# Patient Record
Sex: FEMALE | Race: BLACK OR AFRICAN AMERICAN | URBAN - METROPOLITAN AREA
[De-identification: names, ages, dates, MRNs, and addresses within clinical notes are randomized per-mention and may not be internally consistent; named-entity substitution may affect disease eponyms.]

---

## 2023-05-31 ENCOUNTER — APPOINTMENT (OUTPATIENT)
Dept: OTHER | Facility: HOSPITAL | Age: 28
End: 2023-05-31
Attending: PREVENTIVE MEDICINE

## 2023-06-02 ENCOUNTER — APPOINTMENT (OUTPATIENT)
Dept: OTHER | Facility: HOSPITAL | Age: 28
End: 2023-06-02
Attending: PREVENTIVE MEDICINE

## 2024-07-13 PROBLEM — E66.9 OBESITY (BMI 30-39.9): Status: ACTIVE | Noted: 2019-09-30

## 2024-07-13 PROBLEM — E66.9 OBESITY: Status: ACTIVE | Noted: 2024-07-13

## 2024-07-16 ENCOUNTER — TELEPHONE (OUTPATIENT)
Dept: FAMILY MEDICINE CLINIC | Facility: CLINIC | Age: 29
End: 2024-07-16

## 2024-07-16 ENCOUNTER — OFFICE VISIT (OUTPATIENT)
Dept: FAMILY MEDICINE CLINIC | Facility: CLINIC | Age: 29
End: 2024-07-16
Payer: COMMERCIAL

## 2024-07-16 VITALS
RESPIRATION RATE: 23 BRPM | BODY MASS INDEX: 49.41 KG/M2 | WEIGHT: 293 LBS | HEIGHT: 64.5 IN | SYSTOLIC BLOOD PRESSURE: 114 MMHG | HEART RATE: 95 BPM | OXYGEN SATURATION: 98 % | TEMPERATURE: 97 F | DIASTOLIC BLOOD PRESSURE: 66 MMHG

## 2024-07-16 DIAGNOSIS — Z3A.01 LESS THAN 8 WEEKS GESTATION OF PREGNANCY (HCC): Primary | ICD-10-CM

## 2024-07-16 DIAGNOSIS — N62 LARGE BREASTS: ICD-10-CM

## 2024-07-16 PROCEDURE — 3074F SYST BP LT 130 MM HG: CPT | Performed by: FAMILY MEDICINE

## 2024-07-16 PROCEDURE — 99203 OFFICE O/P NEW LOW 30 MIN: CPT | Performed by: FAMILY MEDICINE

## 2024-07-16 PROCEDURE — 3008F BODY MASS INDEX DOCD: CPT | Performed by: FAMILY MEDICINE

## 2024-07-16 PROCEDURE — 3078F DIAST BP <80 MM HG: CPT | Performed by: FAMILY MEDICINE

## 2024-07-16 NOTE — PROGRESS NOTES
HPI:     Delphine Ceballos is a 28 year old female presents for    Referral for .  She is a new pt to the clinic.  [facility] and [facility] her $500 which she does not have.  Was told to call insurance to see if covers.  Insurance told her to get a form of medical certification completed.  Previously was on depo but stopped last yr due to insurance change.  Thinking she wants to go on nexplanon.  Is about 5 weeks 3 days based on LMP.  Has a 3 yr old daughter.    Also interested in breast reduction surgery.  Having a lot of back pain due to large breasts.  Wondering where she can go for this.          Medications (Active prior to today's visit):  No current outpatient medications on file.       Allergies:  No Known Allergies    PSFH elements reviewed from today and agreed except as otherwise stated in HPI.  ROS:      Pertinent positives and negatives noted in the the HPI.    PHYSICAL EXAM:     Vitals:    24 1258   BP: 114/66   BP Location: Left arm   Patient Position: Sitting   Cuff Size: large   Pulse: 95   Resp: 23   Temp: 97.3 °F (36.3 °C)   TempSrc: Temporal   SpO2: 98%   Weight: (!) 304 lb 3.2 oz (138 kg)   Height: 5' 4.5\" (1.638 m)     Vital signs reviewed.Appears stated age, well groomed.  Physical Exam  Neurological:      Mental Status: She is alert.   Psychiatric:         Mood and Affect: Mood normal.         Speech: Speech normal.         Behavior: Behavior normal.          ASSESSMENT/PLAN:   28 year old female with    1. Less than 8 weeks gestation of pregnancy (HCC)    2. Large breasts        I have spent 30 minutes in the care of this patient including review of their past medical records and diagnostic tests, updating their chart, seeing the patient, discussing current treatment plans and documenting the encounter.       We reached out to several administrators at [place] and referral department.  We are not aware of medical certification form.  Recommend pt contact her  insurance to see if they can send us the form to review.  In meantime, we [facility]  Recommend contacting insurance for in network plastic surgeon for breast reduction surgery.  [facility] no longer does this type of surgery  F/u in next 1-2 mo for complete px or sooner if needed      Patient/Caregiver Education: There are no barriers to learning. Medical education done.   Outcome: Patient verbalizes understanding and agrees with plan. Advised to call or RTC if symptoms persist or worsen.      7/16/2024  Dulce Hicks, DO    Patient understands plan and follow-up.

## 2024-07-16 NOTE — TELEPHONE ENCOUNTER
Pt completed OV today for referral for .   Per referrals dept referral is needed as Procedure. Patient must have confirmed pregnancy by either US or blood work.     Order pended for provider review

## 2024-07-19 ENCOUNTER — NURSE ONLY (OUTPATIENT)
Dept: FAMILY MEDICINE CLINIC | Facility: CLINIC | Age: 29
End: 2024-07-19
Payer: COMMERCIAL

## 2024-07-19 DIAGNOSIS — Z3A.01 LESS THAN 8 WEEKS GESTATION OF PREGNANCY (HCC): ICD-10-CM

## 2024-07-19 PROCEDURE — 84702 CHORIONIC GONADOTROPIN TEST: CPT | Performed by: FAMILY MEDICINE

## 2024-07-19 NOTE — PROGRESS NOTES
Patient came in for draw of ordered fasting labs. Patient drawn out of right AC, x 1 attempt and tolerated well.  Light green  tube drawn.

## 2024-07-20 LAB — B-HCG SERPL-ACNC: ABNORMAL MIU/ML

## 2024-07-22 ENCOUNTER — TELEPHONE (OUTPATIENT)
Dept: FAMILY MEDICINE CLINIC | Facility: CLINIC | Age: 29
End: 2024-07-22

## 2024-07-23 ENCOUNTER — TELEPHONE (OUTPATIENT)
Dept: FAMILY MEDICINE CLINIC | Facility: CLINIC | Age: 29
End: 2024-07-23

## 2024-07-23 NOTE — TELEPHONE ENCOUNTER
Pt came to drop off form for  that needs to be filled out. Form was placed in nurse bin, pt needs it ASAP.

## 2024-07-24 NOTE — TELEPHONE ENCOUNTER
Referral has already been placed in [facility] and is pending.  Referral updated with date of confirmation of pregnancy. Information added to referral and referral marked as high priority.  Form that was dropped off has been emailed to Larissa Vergara in Referral Dept. Per Larissa Vergara form does not need to be completed and we can disregard form. Awaiting decision.     Patient notified of above and that we will be in touch once approved. She expressed understanding.

## 2024-07-26 NOTE — TELEPHONE ENCOUNTER
Received letter that [facility].     Case #: [id number]

## 2024-07-29 NOTE — TELEPHONE ENCOUNTER
Pt called office asking if her [facility] was [facility] already for her to make her appt.

## 2024-07-29 NOTE — TELEPHONE ENCOUNTER
Pt notified of approval for [facility]. Pt states she [facility] and was told to bring in approved referral.     Referral printed, placed at  for

## 2024-08-12 ENCOUNTER — MED REC SCAN ONLY (OUTPATIENT)
Dept: FAMILY MEDICINE CLINIC | Facility: CLINIC | Age: 29
End: 2024-08-12

## 2024-09-04 ENCOUNTER — OFFICE VISIT (OUTPATIENT)
Dept: FAMILY MEDICINE CLINIC | Facility: CLINIC | Age: 29
End: 2024-09-04
Payer: COMMERCIAL

## 2024-09-04 VITALS
SYSTOLIC BLOOD PRESSURE: 118 MMHG | HEIGHT: 64.5 IN | TEMPERATURE: 98 F | RESPIRATION RATE: 16 BRPM | WEIGHT: 293 LBS | BODY MASS INDEX: 49.41 KG/M2 | DIASTOLIC BLOOD PRESSURE: 72 MMHG | OXYGEN SATURATION: 98 % | HEART RATE: 109 BPM

## 2024-09-04 DIAGNOSIS — R73.01 IMPAIRED FASTING GLUCOSE: ICD-10-CM

## 2024-09-04 DIAGNOSIS — Z13.21 SCREENING FOR ENDOCRINE, NUTRITIONAL, METABOLIC AND IMMUNITY DISORDER: ICD-10-CM

## 2024-09-04 DIAGNOSIS — Z13.0 SCREENING FOR ENDOCRINE, NUTRITIONAL, METABOLIC AND IMMUNITY DISORDER: ICD-10-CM

## 2024-09-04 DIAGNOSIS — Z30.09 BIRTH CONTROL COUNSELING: ICD-10-CM

## 2024-09-04 DIAGNOSIS — Z00.00 ANNUAL PHYSICAL EXAM: Primary | ICD-10-CM

## 2024-09-04 DIAGNOSIS — Z13.0 SCREENING FOR IRON DEFICIENCY ANEMIA: ICD-10-CM

## 2024-09-04 DIAGNOSIS — Z13.6 SCREENING FOR HEART DISEASE: ICD-10-CM

## 2024-09-04 DIAGNOSIS — Z13.29 SCREENING FOR ENDOCRINE, NUTRITIONAL, METABOLIC AND IMMUNITY DISORDER: ICD-10-CM

## 2024-09-04 DIAGNOSIS — Z13.228 SCREENING FOR ENDOCRINE, NUTRITIONAL, METABOLIC AND IMMUNITY DISORDER: ICD-10-CM

## 2024-09-04 PROBLEM — E66.9 OBESITY (BMI 30-39.9): Status: RESOLVED | Noted: 2019-09-30 | Resolved: 2024-09-04

## 2024-09-04 PROBLEM — E66.9 OBESITY: Status: RESOLVED | Noted: 2024-07-13 | Resolved: 2024-09-04

## 2024-09-04 PROBLEM — E66.01 MORBID OBESITY (HCC): Status: ACTIVE | Noted: 2024-07-13

## 2024-09-04 LAB
ALBUMIN SERPL-MCNC: 4.3 G/DL (ref 3.2–4.8)
ALBUMIN/GLOB SERPL: 1.3 {RATIO} (ref 1–2)
ALP LIVER SERPL-CCNC: 95 U/L
ALT SERPL-CCNC: 37 U/L
ANION GAP SERPL CALC-SCNC: 13 MMOL/L (ref 0–18)
AST SERPL-CCNC: 26 U/L (ref ?–34)
BASOPHILS # BLD AUTO: 0.03 X10(3) UL (ref 0–0.2)
BASOPHILS NFR BLD AUTO: 0.3 %
BILIRUB SERPL-MCNC: 0.4 MG/DL (ref 0.3–1.2)
BUN BLD-MCNC: 8 MG/DL (ref 9–23)
CALCIUM BLD-MCNC: 9.8 MG/DL (ref 8.7–10.4)
CHLORIDE SERPL-SCNC: 104 MMOL/L (ref 98–112)
CHOLEST SERPL-MCNC: 129 MG/DL (ref ?–200)
CO2 SERPL-SCNC: 20 MMOL/L (ref 21–32)
CREAT BLD-MCNC: 0.64 MG/DL
EGFRCR SERPLBLD CKD-EPI 2021: 125 ML/MIN/1.73M2 (ref 60–?)
EOSINOPHIL # BLD AUTO: 0.11 X10(3) UL (ref 0–0.7)
EOSINOPHIL NFR BLD AUTO: 1.2 %
ERYTHROCYTE [DISTWIDTH] IN BLOOD BY AUTOMATED COUNT: 13.2 %
FASTING PATIENT LIPID ANSWER: YES
FASTING STATUS PATIENT QL REPORTED: YES
GLOBULIN PLAS-MCNC: 3.4 G/DL (ref 2–3.5)
GLUCOSE BLD-MCNC: 86 MG/DL (ref 70–99)
HCT VFR BLD AUTO: 37.8 %
HDLC SERPL-MCNC: 51 MG/DL (ref 40–59)
HGB BLD-MCNC: 12.2 G/DL
IMM GRANULOCYTES # BLD AUTO: 0.02 X10(3) UL (ref 0–1)
IMM GRANULOCYTES NFR BLD: 0.2 %
LDLC SERPL CALC-MCNC: 62 MG/DL (ref ?–100)
LYMPHOCYTES # BLD AUTO: 1.99 X10(3) UL (ref 1–4)
LYMPHOCYTES NFR BLD AUTO: 21.9 %
MCH RBC QN AUTO: 28.5 PG (ref 26–34)
MCHC RBC AUTO-ENTMCNC: 32.3 G/DL (ref 31–37)
MCV RBC AUTO: 88.3 FL
MONOCYTES # BLD AUTO: 0.53 X10(3) UL (ref 0.1–1)
MONOCYTES NFR BLD AUTO: 5.8 %
NEUTROPHILS # BLD AUTO: 6.39 X10 (3) UL (ref 1.5–7.7)
NEUTROPHILS # BLD AUTO: 6.39 X10(3) UL (ref 1.5–7.7)
NEUTROPHILS NFR BLD AUTO: 70.6 %
NONHDLC SERPL-MCNC: 78 MG/DL (ref ?–130)
OSMOLALITY SERPL CALC.SUM OF ELEC: 282 MOSM/KG (ref 275–295)
PLATELET # BLD AUTO: 297 10(3)UL (ref 150–450)
POTASSIUM SERPL-SCNC: 4.1 MMOL/L (ref 3.5–5.1)
PROT SERPL-MCNC: 7.7 G/DL (ref 5.7–8.2)
RBC # BLD AUTO: 4.28 X10(6)UL
SODIUM SERPL-SCNC: 137 MMOL/L (ref 136–145)
TRIGL SERPL-MCNC: 81 MG/DL (ref 30–149)
TSI SER-ACNC: 1.62 MIU/ML (ref 0.55–4.78)
VLDLC SERPL CALC-MCNC: 12 MG/DL (ref 0–30)
WBC # BLD AUTO: 9.1 X10(3) UL (ref 4–11)

## 2024-09-04 PROCEDURE — 85025 COMPLETE CBC W/AUTO DIFF WBC: CPT | Performed by: FAMILY MEDICINE

## 2024-09-04 PROCEDURE — 84443 ASSAY THYROID STIM HORMONE: CPT | Performed by: FAMILY MEDICINE

## 2024-09-04 PROCEDURE — 83036 HEMOGLOBIN GLYCOSYLATED A1C: CPT | Performed by: FAMILY MEDICINE

## 2024-09-04 PROCEDURE — 80053 COMPREHEN METABOLIC PANEL: CPT | Performed by: FAMILY MEDICINE

## 2024-09-04 PROCEDURE — 80061 LIPID PANEL: CPT | Performed by: FAMILY MEDICINE

## 2024-09-04 NOTE — PATIENT INSTRUCTIONS
Labs fasting 8 hrs or more     Ob/gyne referral       Consider wegovy/zepbound if NOT diabetic  Ozempic if diabetic     F/u 1 yr for next physical or sooner if needed

## 2024-09-04 NOTE — PROGRESS NOTES
Subjective:   Delphine Ceballos is a 28 year old female who presents for complete px.      The patient reports:  No changes in nevi  No changes in vision     24 took termination with pregnancy medication  Has been experiencing a lot of mood swings and anger.  Believes a lot of it stems from her weight.  Never did medication for weight.  Tried a detox tea.  Tried intermittent fasting.  None of it works.  Has been trying to eat healthy and none of it is working.  Ultimately think she would like bariatric surgery but would like to start with medication first.  Needs a note for work with the date that we last saw her in the diagnosis of pregnancy.       History:   LMP: Patient's last menstrual period was 2024 (approximate).  Last pap date:  24  Abnormal pap? no  : 2  Para: 1  Hx of pre-eclampsia on magnesium drip     History/Other:    Chief Complaint Reviewed and Verified  No Further Nursing Notes to   Review  Tobacco Reviewed  Allergies Reviewed  Medications Reviewed    Medical History Reviewed  Surgical History Reviewed  OB Status Reviewed    Family History Reviewed  Social History Reviewed         Tobacco:  She has never smoked tobacco.    Current Outpatient Medications   Medication Sig Dispense Refill    ibuprofen 800 MG Oral Tab       promethazine 25 MG Oral Tab TAKE 1 TABLET BY MOUTH 30 MINUTES BEFORE USING MISOPROSTOL. REPEAT EVERY 6 HOURS IF NEEDED FOR NAUSEA OR VOMITING.         Review of Systems:  See HPI     Objective:   /72 (BP Location: Right leg, Patient Position: Sitting, Cuff Size: large)   Pulse 109   Temp 98.1 °F (36.7 °C) (Temporal)   Resp 16   Ht 5' 4.5\" (1.638 m)   Wt 298 lb (135.2 kg)   LMP 2024 (Approximate)   SpO2 98%   Breastfeeding Unknown   BMI 50.36 kg/m²  Estimated body mass index is 50.36 kg/m² as calculated from the following:    Height as of this encounter: 5' 4.5\" (1.638 m).    Weight as of this encounter: 298 lb (135.2  kg).  Physical Exam  Constitutional:       Appearance: Normal appearance.   HENT:      Right Ear: Tympanic membrane and ear canal normal.      Left Ear: Tympanic membrane and ear canal normal.      Mouth/Throat:      Mouth: Mucous membranes are moist.      Pharynx: Oropharynx is clear.   Eyes:      Pupils: Pupils are equal, round, and reactive to light.   Cardiovascular:      Rate and Rhythm: Normal rate and regular rhythm.      Pulses: Normal pulses.      Heart sounds: No murmur heard.     No friction rub. No gallop.   Pulmonary:      Effort: Pulmonary effort is normal. No respiratory distress.      Breath sounds: No wheezing, rhonchi or rales.   Abdominal:      General: Bowel sounds are normal. There is no distension.      Palpations: Abdomen is soft.      Tenderness: There is no abdominal tenderness.      Comments: Morbidly obese   Musculoskeletal:         General: No tenderness.      Cervical back: Neck supple.      Right lower leg: No edema.      Left lower leg: No edema.   Lymphadenopathy:      Cervical: No cervical adenopathy.   Skin:     General: Skin is warm.   Neurological:      General: No focal deficit present.      Mental Status: She is alert.   Psychiatric:         Mood and Affect: Mood normal.         Speech: Speech normal.         Behavior: Behavior normal.       Assessment & Plan:   1. Annual physical exam    2. Impaired fasting glucose    3. Screening for endocrine, nutritional, metabolic and immunity disorder    4. Screening for heart disease    5. Screening for iron deficiency anemia    6. Birth control counseling        Screening labs-lipids, CBC, CMP, TSH.  Also A1c due to history of impaired fasting glucose  Pap through GYN.  Would also like to see them for contraception discussion.  Gave referral.  Is thinking she would like a device   Immunizations UTD  Discussed weight loss medication in detail.  Will first determine if patient is in fact diabetic or not.  We discussed that Wegovy/Zepbound  would be her options if she is not diabetic versus Ozempic if she is diabetic.  Do ultimately think her best option of sustained and profound weight loss would be bariatric surgery.  She is interested in this and will be looking into it over the next year  Patient counseling: nutrition: stressed importance of moderation in sodium/caffeine intake, saturated fat and cholesterol, caloric balance, sufficient intake of fresh fruits, vegetables, fiber, calcium, iron.  Exercise: stressed the importance of regular exercise  Follow-up 1 year for next physical or sooner if needed or if we start her on weight loss medication or if she is in fact diabetic    Dulce Hicks DO, 09/04/24, 3:58 PM

## 2024-09-04 NOTE — PROGRESS NOTES
Patient came in for draw of ordered fasting labs. Patient drawn out of left  AC, x 1 attempt and tolerated well.  Light green,  lavender tube drawn.

## 2024-09-05 LAB
EST. AVERAGE GLUCOSE BLD GHB EST-MCNC: 105 MG/DL (ref 68–126)
HBA1C MFR BLD: 5.3 % (ref ?–5.7)

## 2024-12-13 ENCOUNTER — OFFICE VISIT (OUTPATIENT)
Dept: FAMILY MEDICINE CLINIC | Facility: CLINIC | Age: 29
End: 2024-12-13
Payer: COMMERCIAL

## 2024-12-13 VITALS
OXYGEN SATURATION: 98 % | RESPIRATION RATE: 23 BRPM | SYSTOLIC BLOOD PRESSURE: 112 MMHG | HEIGHT: 64.5 IN | TEMPERATURE: 97 F | HEART RATE: 97 BPM | BODY MASS INDEX: 49.41 KG/M2 | WEIGHT: 293 LBS | DIASTOLIC BLOOD PRESSURE: 70 MMHG

## 2024-12-13 DIAGNOSIS — E66.01 MORBID OBESITY (HCC): ICD-10-CM

## 2024-12-13 DIAGNOSIS — R21 RASH: Primary | ICD-10-CM

## 2024-12-13 DIAGNOSIS — N62 LARGE BREASTS: ICD-10-CM

## 2024-12-13 DIAGNOSIS — N89.8 VAGINAL DISCHARGE: ICD-10-CM

## 2024-12-13 DIAGNOSIS — N89.8 VAGINAL ODOR: ICD-10-CM

## 2024-12-13 PROCEDURE — 3074F SYST BP LT 130 MM HG: CPT | Performed by: FAMILY MEDICINE

## 2024-12-13 PROCEDURE — 87491 CHLMYD TRACH DNA AMP PROBE: CPT | Performed by: FAMILY MEDICINE

## 2024-12-13 PROCEDURE — 3008F BODY MASS INDEX DOCD: CPT | Performed by: FAMILY MEDICINE

## 2024-12-13 PROCEDURE — 87591 N.GONORRHOEAE DNA AMP PROB: CPT | Performed by: FAMILY MEDICINE

## 2024-12-13 PROCEDURE — 99215 OFFICE O/P EST HI 40 MIN: CPT | Performed by: FAMILY MEDICINE

## 2024-12-13 PROCEDURE — 81514 NFCT DS BV&VAGINITIS DNA ALG: CPT | Performed by: FAMILY MEDICINE

## 2024-12-13 PROCEDURE — G2211 COMPLEX E/M VISIT ADD ON: HCPCS | Performed by: FAMILY MEDICINE

## 2024-12-13 PROCEDURE — 3078F DIAST BP <80 MM HG: CPT | Performed by: FAMILY MEDICINE

## 2024-12-13 RX ORDER — NYSTATIN AND TRIAMCINOLONE ACETONIDE 100000; 1 [USP'U]/G; MG/G
1 CREAM TOPICAL 2 TIMES DAILY
Qty: 60 G | Refills: 0 | Status: SHIPPED | OUTPATIENT
Start: 2024-12-13 | End: 2024-12-27

## 2024-12-13 NOTE — PROGRESS NOTES
HPI:     Delphine Ceballos is a 29 year old female presents for    Multiple issues.  Is having upper and lower back pain for years.  Knows it's due to her large breasts.  Wears size 50H bra.  Feels like her breasts are very heavy.  Wants a breast reduction.    Also has a rash on upper back for months.  First thought she had an appt with derm, but then doesn't.  Just wants us to prescribe hydrocortisone for her.    Also has irritation under the breast tissue.  Has a rash under breasts.  It's pruritic.  Has large breast pannus.  Present for 4 days.  No drainage.    Is also having vaginal smell and discharge for 1.5 weeks.  Not itchy.  No urinary symptoms.  No vaginal lesions.       Medications (Active prior to today's visit):  Current Outpatient Medications   Medication Sig Dispense Refill    ibuprofen 800 MG Oral Tab          Allergies:  Allergies[1]    PSFH elements reviewed from today and agreed except as otherwise stated in HPI.  ROS:      Pertinent positives and negatives noted in the the HPI.    PHYSICAL EXAM:     Vitals:    12/13/24 1243   BP: 112/70   BP Location: Left arm   Patient Position: Sitting   Cuff Size: large   Pulse: 97   Resp: 23   Temp: 97.1 °F (36.2 °C)   TempSrc: Temporal   SpO2: 98%   Weight: (!) 303 lb 3.2 oz (137.5 kg)   Height: 5' 4.5\" (1.638 m)     Vital signs reviewed.Appears stated age, well groomed.  Physical Exam  Constitutional:       General: She is awake.      Appearance: Normal appearance. She is well-developed.   Cardiovascular:      Rate and Rhythm: Normal rate and regular rhythm.      Pulses: Normal pulses.      Heart sounds: No murmur heard.     No friction rub. No gallop.   Pulmonary:      Effort: Pulmonary effort is normal. No respiratory distress.      Breath sounds: No wheezing, rhonchi or rales.   Chest:       Abdominal:      General: Bowel sounds are normal. There is no distension.      Palpations: Abdomen is soft.      Tenderness: There is no abdominal tenderness.       Comments: Morbidly obese    Genitourinary:     Labia:         Right: No rash, tenderness or lesion.         Left: No rash, tenderness or lesion.       Vagina: Vaginal discharge (scant, white, clear) present. No lesions.      Cervix: Normal.   Musculoskeletal:         General: No tenderness.      Cervical back: Neck supple.      Right lower leg: No edema.      Left lower leg: No edema.   Skin:     General: Skin is warm.      Comments: Hyperpigmented plaques scatter on upper back   Neurological:      General: No focal deficit present.      Mental Status: She is alert.   Psychiatric:         Mood and Affect: Mood normal.         Speech: Speech normal.         Behavior: Behavior normal. Behavior is cooperative.          ASSESSMENT/PLAN:   29 year old female with    1. Rash    2. Large breasts    3. Vaginal discharge    4. Vaginal odor    5. Morbid obesity (HCC)    6. BMI 50.0-59.9, adult (HCC)        The patient and provider have a longitudinal relationship to address/treat the serious or complex condition as stated in this encounter.      I have spent 40 minutes in the care of this patient including review of their past medical records and diagnostic tests, updating their chart, seeing the patient, discussing current treatment plans and documenting the encounter.       Suspect rash to be fungal in nature under breast tissue-can use nystatin/triamcinolone cream bid for up to 2 weeks at a time.  If no better in 2 weeks, needs f/u with derm   Recommend f/u with derm for rash on back.  If can't get in with them in reasonable time frame, we will send in steroid cream bid for up to 2 weeks  Vaginal discharge w/ uncertain prognosis.  Obtain vaginosis probe and GC/CT probe   Needs f/u with bariatrics to discuss bariatric surgery   If pt is interested in breast reduction, needs to contact her insurance for in network plastic surgeon as [facility] does not do reductions  F/u Sept for complete px or sooner if  needed    Patient/Caregiver Education: There are no barriers to learning. Medical education done.   Outcome: Patient verbalizes understanding and agrees with plan. Advised to call or RTC if symptoms persist or worsen.    12/13/2024  Dulce Hicsk, DO    Patient understands plan and follow-up.           [1] No Known Allergies

## 2024-12-13 NOTE — PATIENT INSTRUCTIONS
Vaginosis probe and gc/ct pending    Derm referral  Bariatrics referral    Call us when find in network plastic surgeon that does breast reductions     Nystatin-triamcinalone cream for under breast tissue twice a day for 2 weeks.  If no better after 2 weeks, let us know and f/u with derm

## 2024-12-14 ENCOUNTER — PATIENT MESSAGE (OUTPATIENT)
Dept: FAMILY MEDICINE CLINIC | Facility: CLINIC | Age: 29
End: 2024-12-14

## 2024-12-14 LAB
BV BACTERIA DNA VAG QL NAA+PROBE: POSITIVE
C GLABRATA DNA VAG QL NAA+PROBE: NEGATIVE
C KRUSEI DNA VAG QL NAA+PROBE: NEGATIVE
CANDIDA DNA VAG QL NAA+PROBE: NEGATIVE
T VAGINALIS DNA VAG QL NAA+PROBE: NEGATIVE

## 2024-12-16 LAB
C TRACH DNA SPEC QL NAA+PROBE: NEGATIVE
N GONORRHOEA DNA SPEC QL NAA+PROBE: NEGATIVE

## 2024-12-16 RX ORDER — NYSTATIN 100000 U/G
1 CREAM TOPICAL 2 TIMES DAILY
Qty: 60 G | Refills: 0 | Status: SHIPPED | OUTPATIENT
Start: 2024-12-16 | End: 2024-12-30

## 2024-12-16 RX ORDER — METRONIDAZOLE 500 MG/1
500 TABLET ORAL 2 TIMES DAILY
Qty: 14 TABLET | Refills: 0 | Status: SHIPPED | OUTPATIENT
Start: 2024-12-16 | End: 2024-12-23

## 2024-12-16 NOTE — TELEPHONE ENCOUNTER
Patient has been informed to start on bid Flagyl for treatment of BV.  She agrees with plan.  She is inquiring about Nystatin.... states her insurance will not cover cost and she is asking for alternate medication. Shereen order on 12/13/24 for breast rash.   Thank you.

## 2025-03-12 ENCOUNTER — OFFICE VISIT (OUTPATIENT)
Dept: FAMILY MEDICINE CLINIC | Facility: CLINIC | Age: 30
End: 2025-03-12
Payer: COMMERCIAL

## 2025-03-12 VITALS
HEIGHT: 64.5 IN | DIASTOLIC BLOOD PRESSURE: 82 MMHG | BODY MASS INDEX: 49.41 KG/M2 | OXYGEN SATURATION: 98 % | TEMPERATURE: 98 F | HEART RATE: 103 BPM | RESPIRATION RATE: 19 BRPM | WEIGHT: 293 LBS | SYSTOLIC BLOOD PRESSURE: 124 MMHG

## 2025-03-12 DIAGNOSIS — Z71.3 WEIGHT LOSS COUNSELING, ENCOUNTER FOR: ICD-10-CM

## 2025-03-12 DIAGNOSIS — Z59.87 MATERIAL HARDSHIP DUE TO LIMITED FINANCIAL RESOURCES, NOT ELSEWHERE CLASSIFIED: ICD-10-CM

## 2025-03-12 DIAGNOSIS — R06.83 SNORING: ICD-10-CM

## 2025-03-12 DIAGNOSIS — E66.01 MORBID OBESITY (HCC): ICD-10-CM

## 2025-03-12 DIAGNOSIS — B37.0 THRUSH: ICD-10-CM

## 2025-03-12 DIAGNOSIS — R21 RASH: Primary | ICD-10-CM

## 2025-03-12 PROCEDURE — 3079F DIAST BP 80-89 MM HG: CPT | Performed by: FAMILY MEDICINE

## 2025-03-12 PROCEDURE — 3008F BODY MASS INDEX DOCD: CPT | Performed by: FAMILY MEDICINE

## 2025-03-12 PROCEDURE — 99215 OFFICE O/P EST HI 40 MIN: CPT | Performed by: FAMILY MEDICINE

## 2025-03-12 PROCEDURE — 3074F SYST BP LT 130 MM HG: CPT | Performed by: FAMILY MEDICINE

## 2025-03-12 PROCEDURE — G2211 COMPLEX E/M VISIT ADD ON: HCPCS | Performed by: FAMILY MEDICINE

## 2025-03-12 RX ORDER — IBUPROFEN 800 MG/1
800 TABLET, FILM COATED ORAL EVERY 8 HOURS PRN
Qty: 30 TABLET | Refills: 0 | Status: SHIPPED | OUTPATIENT
Start: 2025-03-12

## 2025-03-12 RX ORDER — NYSTATIN 100000 [USP'U]/ML
5 SUSPENSION ORAL 4 TIMES DAILY
Qty: 250 ML | Refills: 0 | Status: SHIPPED | OUTPATIENT
Start: 2025-03-12 | End: 2025-03-22

## 2025-03-12 RX ORDER — TRIAMCINOLONE ACETONIDE OINTMENT USP, 0.05% 0.5 MG/G
1 OINTMENT TOPICAL 2 TIMES DAILY
Qty: 60 G | Refills: 0 | Status: SHIPPED | OUTPATIENT
Start: 2025-03-12 | End: 2025-03-26

## 2025-03-12 SDOH — ECONOMIC STABILITY - INCOME SECURITY: MATERIAL HARDSHIP DUE TO LIMITED FINANCIAL RESOURCES, NOT ELSEWHERE CLASSIFIED: Z59.87

## 2025-03-12 NOTE — PROGRESS NOTES
HPI:     Delphine Ceballos is a 29 year old female presents for    Multiple issues.  Says the rash on her back has not gone away and now has spread to the other shoulder.  Never followed up with derm as previously recommended in December.  Didn't notify our office either to get put on steroid cream/ointment.  Is itchy at times.  No new products.  Also reports for 2 weeks splotches on her tongue and painful tongue.  Reports pain is 6/10 on the pain scale.  No new foods or products in mouth.  No new toothpaste or mouth wash.  Tried doing salt water rinses.  Also is frustrated her insurance won't cover GLP-1s.  Said she tried calling her insurance about if breast reduction surgery is covered and they said we needed to give them a letter giving them a physician code.  Also needs refill of ibuprofen.  Takes it as needed.  Also reports she is tired all the time with no energy.  Snores at night and stops breathing at night on occasion according to her mom.  As I'm walking out the door mentions maybe having BV again.      Medications (Active prior to today's visit):  Current Outpatient Medications   Medication Sig Dispense Refill    ibuprofen 800 MG Oral Tab Take 1 tablet (800 mg total) by mouth every 8 (eight) hours as needed for Pain. 30 tablet 0       Allergies:  Allergies[1]    PSFH elements reviewed from today and agreed except as otherwise stated in HPI.  ROS:      Pertinent positives and negatives noted in the the HPI.    PHYSICAL EXAM:     Vitals:    03/12/25 1302   BP: 124/82   BP Location: Left arm   Patient Position: Sitting   Cuff Size: large   Pulse: 103   Resp: 19   Temp: 98.4 °F (36.9 °C)   TempSrc: Temporal   SpO2: 98%   Weight: (!) 304 lb 6.4 oz (138.1 kg)   Height: 5' 4.5\" (1.638 m)     Vital signs reviewed.Appears stated age, well groomed.  Physical Exam  Constitutional:       Appearance: Normal appearance.   Cardiovascular:      Rate and Rhythm: Normal rate and regular rhythm.      Pulses: Normal pulses.       Heart sounds: No murmur heard.     No friction rub. No gallop.   Pulmonary:      Effort: Pulmonary effort is normal. No respiratory distress.      Breath sounds: No wheezing, rhonchi or rales.   Abdominal:      General: Bowel sounds are normal. There is no distension.      Palpations: Abdomen is soft.      Tenderness: There is no abdominal tenderness.      Comments: Morbidly obese    Musculoskeletal:         General: No tenderness.      Cervical back: Neck supple.      Right lower leg: No edema.      Left lower leg: No edema.   Skin:     General: Skin is warm.      Comments: Hyperpigmented scattered irregular macules/plaques on upper back b/l w/ fine scale    Neurological:      General: No focal deficit present.      Mental Status: She is alert.   Psychiatric:         Mood and Affect: Mood is anxious.         Speech: Speech normal.         Behavior: Behavior normal.          ASSESSMENT/PLAN:   29 year old female with    1. Rash    2. Thrush    3. Snoring    4. Morbid obesity (HCC)    5. Weight loss counseling, encounter for    6. Material hardship due to limited financial resources, not elsewhere classified        The patient and provider have a longitudinal relationship to address/treat the serious or complex condition as stated in this encounter.      I have spent 40 minutes in the care of this patient including review of their past medical records and diagnostic tests, updating their chart, seeing the patient, discussing current treatment plans and documenting the encounter.      Rash eczema vs pityriasis rosea.  Will first try treating with triamcinolone ointment bid x 2 weeks.  If no better after this, f/u with derm.  Reprinted referral   Suspect thrush in mouth.  Will treat with nystatin oral suspension for 10 days.  If no better, f/u w/ ENT   Refilled ibuprofen per pt's request   Discussed weight loss.  Already has appt w/ weight loss clinic this summer.  Her insurance will not cover GLP-1  Strongly  recommend sleep study for suspected ALICE  Recommend pt make f/u appt to discuss possible BV or go into walk in clinic today   F/u Sept for complete px or sooner if needed      Patient/Caregiver Education: There are no barriers to learning. Medical education done.   Outcome: Patient verbalizes understanding and agrees with plan. Advised to call or RTC if symptoms persist or worsen.    3/12/2025  Dulce Hicks, DO    Patient understands plan and follow-up.       [1] No Known Allergies

## 2025-03-12 NOTE — PATIENT INSTRUCTIONS
Nystatin   If no better notify office     Sleep study       Triamcinalone ointment bid x 2 weeks.      F/u with derm

## 2025-04-21 ENCOUNTER — PATIENT MESSAGE (OUTPATIENT)
Dept: FAMILY MEDICINE CLINIC | Facility: CLINIC | Age: 30
End: 2025-04-21

## 2025-04-21 DIAGNOSIS — Z64.0 UNWANTED PREGNANCY: Primary | ICD-10-CM

## 2025-04-21 DIAGNOSIS — Z3A.01 LESS THAN 8 WEEKS GESTATION OF PREGNANCY (HCC): ICD-10-CM

## 2025-04-21 NOTE — TELEPHONE ENCOUNTER
This was last done 7/2024.  Did you want Pt to be seen in office?  Last time an HCG was done for confirmation of gestation.  Please advise

## 2025-04-21 NOTE — TELEPHONE ENCOUNTER
Message sent to referral dept regarding approval.  Pt aware- [facility]  Fax [facility] to [facility] is [phone]

## 2025-04-23 ENCOUNTER — TELEPHONE (OUTPATIENT)
Dept: FAMILY MEDICINE CLINIC | Facility: CLINIC | Age: 30
End: 2025-04-23

## 2025-04-23 DIAGNOSIS — N76.0 BV (BACTERIAL VAGINOSIS): Primary | ICD-10-CM

## 2025-04-23 DIAGNOSIS — B96.89 BV (BACTERIAL VAGINOSIS): Primary | ICD-10-CM

## 2025-04-23 DIAGNOSIS — N89.8 VAGINAL DISCHARGE: ICD-10-CM

## 2025-04-23 DIAGNOSIS — B37.0 THRUSH: ICD-10-CM

## 2025-04-23 DIAGNOSIS — N89.8 VAGINAL ODOR: ICD-10-CM

## 2025-04-24 NOTE — TELEPHONE ENCOUNTER
Hello,   In order for  [facility] to continue this review we will need to have a confirmation of pregnancy.  We would need either a blood pregnancy test confirmation or a pregnancy US confirmation.    Please advise how you wish us to continue this review.   Thank you  Larissa

## 2025-04-25 ENCOUNTER — LAB ENCOUNTER (OUTPATIENT)
Dept: LAB | Facility: HOSPITAL | Age: 30
End: 2025-04-25
Attending: FAMILY MEDICINE
Payer: COMMERCIAL

## 2025-04-25 DIAGNOSIS — Z3A.01 LESS THAN 8 WEEKS GESTATION OF PREGNANCY (HCC): ICD-10-CM

## 2025-04-25 LAB — B-HCG SERPL-ACNC: ABNORMAL MIU/ML (ref ?–4.2)

## 2025-04-25 PROCEDURE — 84702 CHORIONIC GONADOTROPIN TEST: CPT

## 2025-04-25 PROCEDURE — 36415 COLL VENOUS BLD VENIPUNCTURE: CPT

## 2025-04-25 NOTE — TELEPHONE ENCOUNTER
Dulce Kurt, DO  7/21/2024  2:21 PM CDT       Pt's serum hcg confirms pregnancy.  She needed this for her referral-see previous documentation.     Component  Ref Range & Units (hover) 7/19/24  8:26 AM   Hcg Quantitative 12,101.9 High    Comment: The table below represents average predicted HCG values for different estimated gestational ages.  Individual results may vary from predicted values. All HCG results should be evaluated in the context of the last menstrual period, pelvic exam and other clinical findings.

## 2025-04-25 NOTE — TELEPHONE ENCOUNTER
ENT referral ready   
LOV 03/12/25    Dr. Hicks's notes:  Suspect thrush in mouth. Will treat with nystatin oral suspension for 10 days. If no better, f/u w/ ENT     LM for pt. to discuss appt. for 04/29/25.    ENT referral?  Gyne referral already placed.  
Notified pt. of ENT referral through my chart.  
Please contact pt about her appt next week.  She had asked for a referral a few days ago for an  and made an appt next week for BV.  She will need to get this testing through gyne and in relation to her procedure.  This is out of my specialty when she has a procedure in this region and needs to be handled by a higher level of care such as an ob/gyne.  Also, if her tongue issues weren't better, we had recommended she see ENT at her last appt so that would be her next step.  If needs referral, please pend.  
Pt. called back and explained to pt. that Dr. Hicks recommends she follow up with ENT in regards to her tongue issues as well as follow up with OB/GYNE in regards to BV for further evaluation and treatment.     Pt. agreeable. Appt. for 25 cancelled.    Referral for ENT pended for review.    Pt. also inquired about the update on her referral for OB. Informed pt. that we have not heard anything back from the referral department in regards to the approval of the referral. Pt. states she needs the referral approved before her appt. on Tuesday for the . Explained to pt. that referrals usually take about 5-7 business days to process in the system. Gave pt. the number for the referral department [phone].  
Referral for OB in regards to the BV pended for review.  
She should call ob first and let them know she is in process of getting an .  I don't know what their protocol will be in reference to having her wait to get tested with having a procedure done in this area.  [facility] also [facility] if [facility] is something they can test her for when she goes for her  . She needs to call them and ask these questions and then when she has the answer, the appropriate referrals can be generated   
What Type Of Note Output Would You Prefer (Optional)?: Standard Output
Has Your Skin Lesion Been Treated?: not been treated
Is This A New Presentation, Or A Follow-Up?: Skin Lesion

## 2025-04-25 NOTE — TELEPHONE ENCOUNTER
HCG blood draw was completed on 07/21/24 for last referral.    HCG order pended for review.    Will attempt to have pt. come into the office today for the blood draw so referral department can proceed with approval process.

## 2025-04-25 NOTE — TELEPHONE ENCOUNTER
Last time I placed this referral on 7/16/24 that was not a requirement.  It shouldn't be a requirement now.

## 2025-04-25 NOTE — TELEPHONE ENCOUNTER
Pt. called the office asking for address to [facility]. Pt. was provided by PSR on where she can find the information to schedule a lab test.

## 2025-04-25 NOTE — TELEPHONE ENCOUNTER
Notified pt. of HCG lab order needed for referral to be approved. Pt. verbalized understanding. Pt. states that she gets out of work at 3:00 pm today and would need a lab appt. for either 3:30 or 4:00 pm.    Given that we do not have any openings at that time, pt. was advised to go to [facility] or any other [facility] labs. Pt. asked for our help in scheduling a lab appt. Attempted to call over to [facility] but received an automated message stating that pt. is able to schedule a lab draw through my chart or can also go in through a walk-in basis.    Pt. notified through my chart.

## 2025-04-27 ENCOUNTER — PATIENT MESSAGE (OUTPATIENT)
Dept: FAMILY MEDICINE CLINIC | Facility: CLINIC | Age: 30
End: 2025-04-27

## 2025-04-29 ENCOUNTER — MED REC SCAN ONLY (OUTPATIENT)
Dept: FAMILY MEDICINE CLINIC | Facility: CLINIC | Age: 30
End: 2025-04-29

## 2025-05-18 ENCOUNTER — HOSPITAL ENCOUNTER (OUTPATIENT)
Age: 30
Discharge: HOME | End: 2025-05-18
Payer: COMMERCIAL

## 2025-05-18 VITALS
OXYGEN SATURATION: 99 % | HEART RATE: 89 BPM | WEIGHT: 293 LBS | TEMPERATURE: 98 F | RESPIRATION RATE: 16 BRPM | SYSTOLIC BLOOD PRESSURE: 119 MMHG | BODY MASS INDEX: 51 KG/M2 | DIASTOLIC BLOOD PRESSURE: 84 MMHG

## 2025-05-18 DIAGNOSIS — N76.0 BV (BACTERIAL VAGINOSIS): Primary | ICD-10-CM

## 2025-05-18 DIAGNOSIS — B96.89 BV (BACTERIAL VAGINOSIS): Primary | ICD-10-CM

## 2025-05-18 DIAGNOSIS — M26.621 ARTHRALGIA OF RIGHT TEMPOROMANDIBULAR JOINT: ICD-10-CM

## 2025-05-18 NOTE — DISCHARGE INSTRUCTIONS
- Perform separately provided TMJ massages throughout the day   - You may benefit from using a      - Complete Metronidazole as directed (DO NOT drink alcohol as you can become very ill)   - Start taking a multivitamin, separate vitamin D (~2000IU+), and probiotics daily - year round   - Limit sugar / soda intake   - Drink plenty of water

## 2025-05-18 NOTE — ED INITIAL ASSESSMENT (HPI)
Pt c/o R ear pain worsening over 1 week, pt also c/o vaginal discharge with foul oder also starting 1 week ago

## 2025-05-19 RX ORDER — METRONIDAZOLE 500 MG/1
500 TABLET ORAL 2 TIMES DAILY
Qty: 14 TABLET | Refills: 0 | Status: SHIPPED | OUTPATIENT
Start: 2025-05-19 | End: 2025-05-26

## 2025-06-11 ENCOUNTER — PATIENT OUTREACH (OUTPATIENT)
Dept: FAMILY MEDICINE CLINIC | Facility: CLINIC | Age: 30
End: 2025-06-11

## 2025-07-31 ENCOUNTER — HOSPITAL ENCOUNTER (OUTPATIENT)
Age: 30
Discharge: HOME | End: 2025-07-31

## 2025-07-31 VITALS
RESPIRATION RATE: 18 BRPM | OXYGEN SATURATION: 99 % | DIASTOLIC BLOOD PRESSURE: 79 MMHG | HEART RATE: 90 BPM | TEMPERATURE: 99 F | SYSTOLIC BLOOD PRESSURE: 114 MMHG

## 2025-07-31 DIAGNOSIS — L30.4 INTERTRIGO: Primary | ICD-10-CM

## 2025-07-31 DIAGNOSIS — M77.8 THUMB TENDONITIS: ICD-10-CM

## 2025-07-31 PROCEDURE — L3924 HFO WITHOUT JOINTS PRE OTS: HCPCS | Performed by: PHYSICIAN ASSISTANT

## 2025-07-31 PROCEDURE — 99213 OFFICE O/P EST LOW 20 MIN: CPT | Performed by: PHYSICIAN ASSISTANT

## 2025-08-05 ENCOUNTER — PATIENT MESSAGE (OUTPATIENT)
Dept: FAMILY MEDICINE CLINIC | Facility: CLINIC | Age: 30
End: 2025-08-05

## 2025-08-05 DIAGNOSIS — Z64.0 UNWANTED PREGNANCY: ICD-10-CM

## 2025-08-05 DIAGNOSIS — Z30.09 ENCOUNTER FOR OTHER GENERAL COUNSELING OR ADVICE ON CONTRACEPTION: Primary | ICD-10-CM

## 2025-08-10 ENCOUNTER — HOSPITAL ENCOUNTER (OUTPATIENT)
Age: 30
Discharge: HOME | End: 2025-08-10

## 2025-08-10 VITALS
SYSTOLIC BLOOD PRESSURE: 133 MMHG | OXYGEN SATURATION: 100 % | DIASTOLIC BLOOD PRESSURE: 90 MMHG | HEART RATE: 80 BPM | RESPIRATION RATE: 16 BRPM | TEMPERATURE: 99 F

## 2025-08-10 DIAGNOSIS — N89.8 VAGINAL DISCHARGE: Primary | ICD-10-CM

## 2025-08-10 DIAGNOSIS — O21.9 NAUSEA AND VOMITING IN PREGNANCY (HCC): ICD-10-CM

## 2025-08-10 PROCEDURE — 99213 OFFICE O/P EST LOW 20 MIN: CPT | Performed by: PHYSICIAN ASSISTANT

## 2025-08-10 RX ORDER — DOXYLAMINE SUCCINATE AND PYRIDOXINE HYDROCHLORIDE, DELAYED RELEASE TABLETS 10 MG/10 MG 10; 10 MG/1; MG/1
2 TABLET, DELAYED RELEASE ORAL NIGHTLY
Qty: 20 TABLET | Refills: 0 | Status: SHIPPED | OUTPATIENT
Start: 2025-08-10 | End: 2025-08-20

## 2025-08-11 RX ORDER — METRONIDAZOLE 500 MG/1
500 TABLET ORAL 2 TIMES DAILY
Qty: 14 TABLET | Refills: 0 | Status: SHIPPED | OUTPATIENT
Start: 2025-08-11 | End: 2025-08-18

## 2025-08-25 ENCOUNTER — LAB ENCOUNTER (OUTPATIENT)
Dept: LAB | Age: 30
End: 2025-08-25
Attending: FAMILY MEDICINE

## 2025-08-25 DIAGNOSIS — Z64.0 UNWANTED PREGNANCY: ICD-10-CM

## 2025-08-25 LAB — B-HCG SERPL-ACNC: ABNORMAL MIU/ML (ref ?–4.2)

## 2025-08-25 PROCEDURE — 36415 COLL VENOUS BLD VENIPUNCTURE: CPT

## 2025-08-25 PROCEDURE — 84702 CHORIONIC GONADOTROPIN TEST: CPT

## (undated) NOTE — LETTER
Date: 3/12/2025    Patient Name: Delphine Ceballos          To Whom it may concern:    This letter has been written at the patient's request. The above patient was seen at [facility] for treatment of a medical condition today 03/12/2025.        Sincerely,    Dulce Hicks DO

## (undated) NOTE — LETTER
Date: 9/4/2024    Patient Name: Delphine Ceballos          To Whom it may concern:      This letter has been written at the patient's request. The above patient was seen at [facility] for treatment of a medical condition on 7/19/2024 and confirmed pregnancy on that date.      Sincerely,    Dulce Hicks DO